# Patient Record
(demographics unavailable — no encounter records)

---

## 2025-03-18 NOTE — PHYSICAL EXAM
[2+] : left 2+ [Alert] : alert [Oriented to Person] : oriented to person [Oriented to Place] : oriented to place [Oriented to Time] : oriented to time [Calm] : calm [Right Femoral Bruit] : no bruit heard over the right femoral artery [Ankle Swelling (On Exam)] : present [Ankle Swelling Bilaterally] : bilaterally  [Varicose Veins Of Lower Extremities] : bilaterally [] : bilaterally [Ankle Swelling On The Right] : mild [de-identified] : nad [de-identified] : wnl [de-identified] : no resp effort  [FreeTextEntry1] : Mild bilateral leg venous insufficiency w mild bilateral leg stasis dermatitis, hyperpigmentation in the gator area and mild bilateral leg edema Multiple  bilateral leg small  tortuous varicose  veins and spider veins  ant post medial thigh and calf and shin RLE Varicose veins measuring  1-3mm in size on the thigh/calf /shin LLE Varicose veins measuring  1-3mm in size on the thigh/calf /shin no wounds/ulcers     [de-identified] : wnl [de-identified] : Grant Cranial nerves 2-12 grant grossly intact [de-identified] : cooperative

## 2025-03-18 NOTE — DISCUSSION/SUMMARY
[Home] : at home, [unfilled] , at the time of the visit. [Medical Office: ([facility])___] : at the medical office located in  [Other:____] : [unfilled] [Verbal consent obtained from patient] : the patient, [unfilled] [Arterial/Venous Disease] : arterial/venous disease [Other: _____] : [unfilled]

## 2025-03-18 NOTE — HISTORY OF PRESENT ILLNESS
[FreeTextEntry1] : pt is compliant w  comp stockings  wears them 3-4 days per week and reports  sig improvement in le swelling and discomfort w latex free comp stockings since last ov \par  c/o overall stable  [de-identified] : pt states legs feel well \par  pt is compliant w comp stockings\par  pt reports no cerebrovasc c/o

## 2025-03-18 NOTE — DATA REVIEWED
[FreeTextEntry1] : 07/10/2013 Venous Doppler grant no dvt svt no sono evid of reflux  7/8/2016 Carotid Duplex [facility] less 50% stenosis Bl ant [facility] flow  6/21/2019 Carotid Duplex [facility] less 50% stenosis Bl ant [facility] flow  1/25/2023 Carotid Duplex [facility] less 50% stenosis Bl ant [facility] flow

## 2025-03-18 NOTE — ASSESSMENT
[Arterial/Venous Disease] : arterial/venous disease [Other: _____] : [unfilled] [Foot care/Footwear] : foot care/footwear [FreeTextEntry1] : Impression venous insuff w sx stable and asymptomatic carotid stenosis    [facility] management leg elevation,knee high comp stockings (latex free), wt loss diet control ov w carotid duplex s/o stenosis in 3 years jan 2026     letter faxed to Dr WINNIE Barry MD    Varicose veins are enlarged, twisted veins. Varicose veins are caused by increased blood pressure in the veins. The blood moves towards the heart by 1-way valves in the veins. When the valves become weakened or damaged, blood can collect in the veins and pool in your lower legs (ankles). This causes the veins to become enlarged and incompetent with reflux. Sitting or standing for long periods can cause blood to pool in the leg veins, increasing the pressure within the veins.  Risk factors for varicose veins or venous disease may include: obesity, older age, standing or sitting for prolonged periods of time for several years, being female, pregnancy, taking oral contraceptive pills or hormone replacement, being inactive, and/or smoking.  The most common symptoms of varicose veins are sensations in the legs, such as a heavy feeling, burning, and/or aching. However, each individual may experience symptoms differently. Other symptoms may include: color changes in the skin, sores on the legs, or rash. Severe varicose veins or venous disease may eventually produce long-term mild swelling that can result in more serious skin and tissue problems, such as ulcers and non-healing sores.  Varicose veins and venous disease are diagnosed by a complete medical history, physical examination, and diagnostic studies for varicose veins including duplex ultrasound and color-flow imaging.  Medical treatment for varicose veins and venous disease include: compression stockings, sclerotherapy, endovenous ablation and/or surgical treatment with microphlebectomy.